# Patient Record
Sex: FEMALE | Race: BLACK OR AFRICAN AMERICAN
[De-identification: names, ages, dates, MRNs, and addresses within clinical notes are randomized per-mention and may not be internally consistent; named-entity substitution may affect disease eponyms.]

---

## 2019-02-02 ENCOUNTER — HOSPITAL ENCOUNTER (EMERGENCY)
Dept: HOSPITAL 92 - ERS | Age: 19
Discharge: HOME | End: 2019-02-02
Payer: SELF-PAY

## 2019-02-02 DIAGNOSIS — Z79.51: ICD-10-CM

## 2019-02-02 DIAGNOSIS — F41.9: ICD-10-CM

## 2019-02-02 DIAGNOSIS — R50.9: ICD-10-CM

## 2019-02-02 DIAGNOSIS — F32.9: ICD-10-CM

## 2019-02-02 DIAGNOSIS — N39.0: Primary | ICD-10-CM

## 2019-02-02 LAB
BACTERIA UR QL AUTO: (no result) HPF
HYALINE CASTS #/AREA URNS LPF: (no result) LPF
PREGU CONTROL BACKGROUND?: (no result)
PREGU CONTROL BAR APPEAR?: YES
RBC UR QL AUTO: (no result) HPF (ref 0–3)
SP GR UR STRIP: 1.03 (ref 1–1.04)
WBC UR QL AUTO: (no result) HPF (ref 0–3)

## 2019-02-02 PROCEDURE — 87804 INFLUENZA ASSAY W/OPTIC: CPT

## 2019-02-02 PROCEDURE — 81015 MICROSCOPIC EXAM OF URINE: CPT

## 2019-02-02 PROCEDURE — 87086 URINE CULTURE/COLONY COUNT: CPT

## 2019-02-02 PROCEDURE — 99283 EMERGENCY DEPT VISIT LOW MDM: CPT

## 2019-02-02 PROCEDURE — 81003 URINALYSIS AUTO W/O SCOPE: CPT

## 2019-02-02 PROCEDURE — 87077 CULTURE AEROBIC IDENTIFY: CPT

## 2019-02-02 PROCEDURE — 81025 URINE PREGNANCY TEST: CPT

## 2020-10-14 NOTE — ULT
OB ULTRASOUND GREATER THAN 14 WEEKS COMPLETE:

 

HISTORY: 

Anatomy, cervical length.

 

FINDINGS: 

A single viable intrauterine fetus is noted in breech presentation.  Cervical length 4.1 cm.  Placent
a is anterior.  Fetal heart rate 138 b.p.m.  Normal amniotic fluid with an OVIDIO of 10.3 cm.  

 

Fetal anatomy including visualized brain, 4-chamber heart, 3-vessel cord, stomach, bladder, kidneys, 
spine, and extremity regions were unremarkable.  

 

Fetal Biometry:

BPD:  4.9 cm-20 weeks 6 days.

Head circumference:  19 cm-21 weeks 2 days.

Abdominal circumference:  15.9 cm-21 weeks 1 day.

Femur length:  3.5 cm-21 weeks 0 days.

 

IMPRESSION: 

Single viable intrauterine fetus 21 weeks 1 day.

 

Estimated date of delivery 2/28/2021.

 

Estimated fetal weight 392 gm.

 

POS: RRE